# Patient Record
Sex: MALE | Race: WHITE
[De-identification: names, ages, dates, MRNs, and addresses within clinical notes are randomized per-mention and may not be internally consistent; named-entity substitution may affect disease eponyms.]

---

## 2022-06-13 ENCOUNTER — HOSPITAL ENCOUNTER (EMERGENCY)
Dept: HOSPITAL 54 - ER | Age: 27
Discharge: HOME | End: 2022-06-13
Payer: SELF-PAY

## 2022-06-13 VITALS — SYSTOLIC BLOOD PRESSURE: 107 MMHG | DIASTOLIC BLOOD PRESSURE: 65 MMHG

## 2022-06-13 VITALS — HEIGHT: 69 IN | BODY MASS INDEX: 24.44 KG/M2 | WEIGHT: 165 LBS

## 2022-06-13 DIAGNOSIS — Z79.899: ICD-10-CM

## 2022-06-13 DIAGNOSIS — F11.10: Primary | ICD-10-CM

## 2022-06-13 DIAGNOSIS — Z98.890: ICD-10-CM

## 2022-06-13 DIAGNOSIS — F17.200: ICD-10-CM

## 2022-06-13 NOTE — NUR
TO ER BED 10. BIBS C/O FEELING WEAK AND ABOUT TO FAINT S/P TAKING OXYCODONE AND 
POSSIBLY FENTANYL. AAOX4. AMBULATORY. CONNECTED TO MONITOR. VSS. AWAITING MD TOBAR

## 2022-06-13 NOTE — NUR
Gave pt dc instructions with med info, pt confirmed understanding of aftercare. 
 all questions answered.  VSS, PE WNL, NAD.  107/65, 99%RA, 88bpm,16rpm. 0/10 
pain.  Pt walked out of dept with steady gait, no s/sx of intoxication, 
appeared completely clear headed and sober. No s/sxof distess present.

## 2024-03-30 NOTE — NUR
Received thorough report from staff [facility]DER using SBAR method.  All questions 
answered.  Nothing pending on pt.  No orders, pt set to be DCed home after 
presenting with a poss drug OD/reaction.  At bedside for assessment, pt states 
that he is an addict with 2 weeks clean when he took what he thought was 
oxycontine, but what he thinks turned out to be fentenyl, he states that he had 
a weird drug reaction that was reminicent of fentenyl, for he very is familiar 
with the sensation of oxy and what he was feeling was not oxy.  Pt states that 
the reaction has since past and that he feels much better.  I informed him he 
will feel more and more sober as time passes.  Pt has good color, temp and 
appearance, aaox4, lucid and completely with it, could answer quetions and 
follow commands.  VSS, PE WNL.  Good distal pulses x 4ext, NSR without ectopy 
of bedside monitor, RRR, normal s1s2 no m/r/g.  Pt states that he is otherwise 
completely healthy with no med issues.  Provided pt with some education 
regarding the neurophysiology of the addiction process and some information on 
resources available inthe area including NA, AA, [facility], ect.  Pt 
acknowledged understanding of teaching.  Denies any pain, sob, n/v, dizziness, 
anxiety or discomfort.  Informed pt that he will be dced soon, and that we are 
just waiting for DC paperwork from [facility].  No s/sxof distress present. Never